# Patient Record
Sex: FEMALE | Race: WHITE | HISPANIC OR LATINO | Employment: UNEMPLOYED | URBAN - METROPOLITAN AREA
[De-identification: names, ages, dates, MRNs, and addresses within clinical notes are randomized per-mention and may not be internally consistent; named-entity substitution may affect disease eponyms.]

---

## 2023-01-17 ENCOUNTER — OFFICE VISIT (OUTPATIENT)
Dept: OBSTETRICS AND GYNECOLOGY | Facility: CLINIC | Age: 23
End: 2023-01-17
Payer: COMMERCIAL

## 2023-01-17 VITALS
BODY MASS INDEX: 32.74 KG/M2 | HEIGHT: 68 IN | DIASTOLIC BLOOD PRESSURE: 82 MMHG | WEIGHT: 216 LBS | HEART RATE: 91 BPM | SYSTOLIC BLOOD PRESSURE: 127 MMHG

## 2023-01-17 DIAGNOSIS — R73.09 ELEVATED HEMOGLOBIN A1C: ICD-10-CM

## 2023-01-17 DIAGNOSIS — N93.9 ABNORMAL UTERINE BLEEDING (AUB): ICD-10-CM

## 2023-01-17 DIAGNOSIS — Z01.419 ENCOUNTER FOR GYNECOLOGICAL EXAMINATION WITHOUT ABNORMAL FINDING: Primary | ICD-10-CM

## 2023-01-17 PROCEDURE — 99385 PREV VISIT NEW AGE 18-39: CPT | Performed by: OBSTETRICS & GYNECOLOGY

## 2023-01-17 PROCEDURE — 3008F BODY MASS INDEX DOCD: CPT | Performed by: OBSTETRICS & GYNECOLOGY

## 2023-01-17 PROCEDURE — 2014F MENTAL STATUS ASSESS: CPT | Performed by: OBSTETRICS & GYNECOLOGY

## 2023-01-17 PROCEDURE — 99203 OFFICE O/P NEW LOW 30 MIN: CPT | Performed by: OBSTETRICS & GYNECOLOGY

## 2023-01-17 RX ORDER — MEDROXYPROGESTERONE ACETATE 10 MG/1
10 TABLET ORAL DAILY
Qty: 10 TABLET | Refills: 2 | Status: SHIPPED | OUTPATIENT
Start: 2023-01-17 | End: 2023-01-27

## 2023-01-17 NOTE — PROGRESS NOTES
"GYN Annual Exam     CC- Here for annual exam.     Esteban Puentes is a 22 y.o. female who presents for annual well woman exam. Periods are irregular, every 3- 6 mths,  lasting 7 days. Dysmenorrhea: mild.  Cyclic symptoms include none. No intermenstrual bleeding, spotting, or discharge.  Pt here to day to discuss irregular periods.     Menarche at 21 years of age   3-6 months x 7 days.   No molimina     OB History    No obstetric history on file.         Current contraception: none- not sexually active  History of abnormal Pap smear: no  Family history of uterine, colon or ovarian cancer: no  History of abnormal mammogram: no  Family history of breast cancer: no  Last Pap :  Not of age.     History reviewed. No pertinent past medical history.    Past Surgical History:   Procedure Laterality Date   • APPENDECTOMY           Current Outpatient Medications:   •  medroxyPROGESTERone (Provera) 10 MG tablet, Take 1 tablet by mouth Daily for 10 doses., Disp: 10 tablet, Rfl: 2    No Known Allergies    Social History     Tobacco Use   • Smoking status: Never   Substance Use Topics   • Alcohol use: Never   • Drug use: Never       Family History   Problem Relation Age of Onset   • No Known Problems Father    • No Known Problems Mother    • Breast cancer Neg Hx    • Ovarian cancer Neg Hx    • Uterine cancer Neg Hx    • Colon cancer Neg Hx    • Deep vein thrombosis Neg Hx    • Pulmonary embolism Neg Hx        Review of Systems   Constitutional: Negative for chills and fever.   Gastrointestinal: Negative for abdominal pain, constipation and diarrhea.   Genitourinary: Positive for menstrual problem. Negative for pelvic pain, vaginal bleeding and vaginal discharge.   All other systems reviewed and are negative.      /82   Pulse 91   Ht 172.7 cm (68\")   Wt 98 kg (216 lb)   LMP 10/21/2022   BMI 32.84 kg/m²     Physical Exam  Constitutional:       General: She is not in acute distress.     Appearance: She is " well-developed. She is obese.   Genitourinary:      Vulva normal.   HENT:      Head: Normocephalic and atraumatic.      Nose: Nose normal.   Eyes:      Conjunctiva/sclera: Conjunctivae normal.      Pupils: Pupils are equal, round, and reactive to light.   Neck:      Thyroid: No thyromegaly.   Cardiovascular:      Rate and Rhythm: Normal rate and regular rhythm.      Heart sounds: Normal heart sounds. No murmur heard.  Pulmonary:      Effort: Pulmonary effort is normal. No respiratory distress.      Breath sounds: Normal breath sounds.   Abdominal:      General: Abdomen is flat. There is no distension.      Palpations: Abdomen is soft.      Tenderness: There is no abdominal tenderness.   Musculoskeletal:         General: No deformity. Normal range of motion.      Cervical back: Normal range of motion and neck supple.      Right lower leg: No edema.      Left lower leg: No edema.   Neurological:      Mental Status: She is alert and oriented to person, place, and time.   Skin:     General: Skin is warm and dry.      Findings: No erythema.   Psychiatric:         Behavior: Behavior normal.         Thought Content: Thought content normal.         Judgment: Judgment normal.   Vitals reviewed. Exam conducted with a chaperone present.               Assessment     Diagnoses and all orders for this visit:    1. Encounter for gynecological examination without abnormal finding (Primary)    2. Abnormal uterine bleeding (AUB)  -     HCG, B-subunit, Quantitative  -     Prolactin  -     CBC & Differential  -     US Non-ob Transvaginal  -     FSH & LH  -     Estradiol  -     Hemoglobin A1c  -     T4, Free  -     TSH  -     Testosterone Free Direct  -     DHEA-Sulfate    Other orders  -     medroxyPROGESTERone (Provera) 10 MG tablet; Take 1 tablet by mouth Daily for 10 doses.  Dispense: 10 tablet; Refill: 2    1)  GYN exam   Virginal female. No STD testing needed  Too young for pap     2) AUB   Suspect PCOS- but did have late  menarche?  So doing work up with labs, ultrasound and provera withdrawal   If PCOS confirmed.   Return to consider menstrual regulation with OCP      Plan     1) Breast Health - Clinical breast exam yearly, Discussed [employer] cancer society recommendations for breast cancer screening, and Self breast awareness monthly  2) Pap - too young   3) Smoking status- non-smoker   4) Encouraged between 7-8 hours of good sleep per night.   5) Follow up prn and one year.       Chuckie Bentley MD   1/17/2023  14:32 EST

## 2023-01-18 LAB
BASOPHILS # BLD AUTO: 0.03 10*3/MM3 (ref 0–0.2)
BASOPHILS NFR BLD AUTO: 0.4 % (ref 0–1.5)
DHEA-S SERPL-MCNC: 346 UG/DL (ref 110–431.7)
EOSINOPHIL # BLD AUTO: 0.15 10*3/MM3 (ref 0–0.4)
EOSINOPHIL NFR BLD AUTO: 1.8 % (ref 0.3–6.2)
ERYTHROCYTE [DISTWIDTH] IN BLOOD BY AUTOMATED COUNT: 11.8 % (ref 12.3–15.4)
ESTRADIOL SERPL-MCNC: 18.2 PG/ML
FSH SERPL-ACNC: 6 MIU/ML
HBA1C MFR BLD: 8.2 % (ref 4.8–5.6)
HCG INTACT+B SERPL-ACNC: <1 MIU/ML
HCT VFR BLD AUTO: 42.3 % (ref 34–46.6)
HGB BLD-MCNC: 14.3 G/DL (ref 12–15.9)
IMM GRANULOCYTES # BLD AUTO: 0.03 10*3/MM3 (ref 0–0.05)
IMM GRANULOCYTES NFR BLD AUTO: 0.4 % (ref 0–0.5)
LH SERPL-ACNC: 14.4 MIU/ML
LYMPHOCYTES # BLD AUTO: 2.77 10*3/MM3 (ref 0.7–3.1)
LYMPHOCYTES NFR BLD AUTO: 33.5 % (ref 19.6–45.3)
MCH RBC QN AUTO: 29.6 PG (ref 26.6–33)
MCHC RBC AUTO-ENTMCNC: 33.8 G/DL (ref 31.5–35.7)
MCV RBC AUTO: 87.6 FL (ref 79–97)
MONOCYTES # BLD AUTO: 0.5 10*3/MM3 (ref 0.1–0.9)
MONOCYTES NFR BLD AUTO: 6 % (ref 5–12)
NEUTROPHILS # BLD AUTO: 4.79 10*3/MM3 (ref 1.7–7)
NEUTROPHILS NFR BLD AUTO: 57.9 % (ref 42.7–76)
NRBC BLD AUTO-RTO: 0 /100 WBC (ref 0–0.2)
PLATELET # BLD AUTO: 285 10*3/MM3 (ref 140–450)
PROLACTIN SERPL-MCNC: 8 NG/ML (ref 4.8–23.3)
RBC # BLD AUTO: 4.83 10*6/MM3 (ref 3.77–5.28)
T4 FREE SERPL-MCNC: 1.22 NG/DL (ref 0.93–1.7)
TSH SERPL DL<=0.005 MIU/L-ACNC: 1.11 UIU/ML (ref 0.27–4.2)
WBC # BLD AUTO: 8.27 10*3/MM3 (ref 3.4–10.8)

## 2023-01-19 ENCOUNTER — TELEPHONE (OUTPATIENT)
Dept: OBSTETRICS AND GYNECOLOGY | Facility: CLINIC | Age: 23
End: 2023-01-19
Payer: COMMERCIAL

## 2023-01-19 ENCOUNTER — PATIENT ROUNDING (BHMG ONLY) (OUTPATIENT)
Dept: OBSTETRICS AND GYNECOLOGY | Facility: CLINIC | Age: 23
End: 2023-01-19
Payer: COMMERCIAL

## 2023-01-19 ENCOUNTER — PATIENT MESSAGE (OUTPATIENT)
Dept: OBSTETRICS AND GYNECOLOGY | Facility: CLINIC | Age: 23
End: 2023-01-19
Payer: COMMERCIAL

## 2023-01-19 LAB — TESTOST FREE SERPL-MCNC: 7.3 PG/ML (ref 0–4.2)

## 2023-01-19 NOTE — PROGRESS NOTES
Elba, her blood work for abnormal uterine bleeding was normal.  Specifically not pregnant or anemic, and her prolactin, thyroid and platelets were normal. However also checked a few other tests. Her testosterone is elevated and need to repeat this down the road and HgA1c was significantly elevated. She is likely a diabetic.  We will be making a referral to primary care so that they can evaluate and begin treatment of her likely type II DM. I used  to review a lot of this but her call dropped twice and was only able to give her the information, not instructions. Needs to make sure follow up with us to review in office as well. Let Jeanie know about referral as well. Thanks, Dr. Bentley

## 2023-01-19 NOTE — PROGRESS NOTES
My chart message has been sent to the patient for PATIENT ROUNDING with [facility].

## 2023-01-19 NOTE — TELEPHONE ENCOUNTER
----- Message from Chuckie Bentley MD sent at 1/19/2023 12:41 PM EST -----  Elba, her blood work for abnormal uterine bleeding was normal.  Specifically not pregnant or anemic, and her prolactin, thyroid and platelets were normal. However also checked a few other tests. Her testosterone is elevated and need to repeat this down the road and HgA1c was significantly elevated. She is likely a diabetic.  We will be making a referral to primary care so that they can evaluate and begin treatment of her likely type II DM. I used  to review a lot of this but her call dropped twice and was only able to give her the information, not instructions. Needs to make sure follow up with us to review in office as well. Let Jeanie know about referral as well. Thanks, Dr. Bentley

## 2023-02-07 ENCOUNTER — TELEPHONE (OUTPATIENT)
Dept: OBSTETRICS AND GYNECOLOGY | Facility: CLINIC | Age: 23
End: 2023-02-07
Payer: COMMERCIAL

## 2023-02-07 ENCOUNTER — OFFICE VISIT (OUTPATIENT)
Dept: OBSTETRICS AND GYNECOLOGY | Facility: CLINIC | Age: 23
End: 2023-02-07
Payer: COMMERCIAL

## 2023-02-07 VITALS
WEIGHT: 216 LBS | HEIGHT: 68 IN | BODY MASS INDEX: 32.74 KG/M2 | SYSTOLIC BLOOD PRESSURE: 122 MMHG | DIASTOLIC BLOOD PRESSURE: 80 MMHG | HEART RATE: 82 BPM

## 2023-02-07 DIAGNOSIS — E28.2 PCOS (POLYCYSTIC OVARIAN SYNDROME): ICD-10-CM

## 2023-02-07 DIAGNOSIS — Z30.011 OCP (ORAL CONTRACEPTIVE PILLS) INITIATION: ICD-10-CM

## 2023-02-07 DIAGNOSIS — R73.09 ELEVATED HEMOGLOBIN A1C: ICD-10-CM

## 2023-02-07 DIAGNOSIS — N93.9 ABNORMAL UTERINE BLEEDING (AUB): Primary | ICD-10-CM

## 2023-02-07 PROCEDURE — 99214 OFFICE O/P EST MOD 30 MIN: CPT | Performed by: OBSTETRICS & GYNECOLOGY

## 2023-02-07 RX ORDER — NORGESTIMATE AND ETHINYL ESTRADIOL 0.25-0.035
1 KIT ORAL DAILY
Qty: 84 TABLET | Refills: 3 | Status: SHIPPED | OUTPATIENT
Start: 2023-02-07

## 2023-02-07 NOTE — TELEPHONE ENCOUNTER
L/m via , for pt to call the office regarding a referral to Internal Medicine/Primary Care Physician.    The scheduling HUB has attempted to contact her to schedule an appointment w/a PCP, but no answer, and no return call.  Please encourage pt to call [phone] at her earliest convenience to schedule.       Pt # [phone]

## 2023-02-07 NOTE — TELEPHONE ENCOUNTER
----- Message from Chuckie Bentley MD sent at 2/7/2023 12:08 PM EST -----  Jeanie, I think we sent in referral to PCP to start evaluation and management of possible Type II DM. Can you follow up and make sure this is happening. Thanks, Dr. Bentley

## 2023-02-07 NOTE — PROGRESS NOTES
"Subjective    is a 22 y.o. female following up from US and labs for AUB.     Chief Complaint   Patient presents with   • Follow-up        HPI     22 y.o. G0   Seen for annual last month with AUB    Menarche at 21 years of age   3-6 months x 7 days.   No molimina   Here to review findings, discuss results and treatment options       Review of Systems   Constitutional: Negative for chills and fever.   Gastrointestinal: Negative for abdominal pain, constipation and diarrhea.   Genitourinary: Positive for menstrual problem. Negative for pelvic pain, vaginal bleeding and vaginal discharge.        Objective   /80   Pulse 82   Ht 172.7 cm (68\")   Wt 98 kg (216 lb)   BMI 32.84 kg/m²   Physical Exam  Constitutional:       General: She is not in acute distress.     Appearance: Normal appearance. She is well-developed and normal weight.   Neck:      Thyroid: No thyromegaly.   Pulmonary:      Effort: No respiratory distress.   Abdominal:      General: Abdomen is flat. There is no distension.      Palpations: Abdomen is soft.      Tenderness: There is no abdominal tenderness.   Musculoskeletal:      Right lower leg: No edema.      Left lower leg: No edema.   Neurological:      Mental Status: She is alert and oriented to person, place, and time.   Skin:     General: Skin is warm and dry.   Psychiatric:         Behavior: Behavior normal.         Thought Content: Thought content normal.         Judgment: Judgment normal.   Vitals reviewed.        GYN ultrasound today   Normal uterus, ovaries, Nothing to explain bleeding or require follow up    Lab Results   Component Value Date    WBC 8.27 01/17/2023    HGB 14.3 01/17/2023    HCT 42.3 01/17/2023    MCV 87.6 01/17/2023     01/17/2023     PRL/TSH - Normal   HgA1c elevated to Type II DM range  DHEA normal   Testosterone - mild elevation   HCG negative   FSH/LH/E2 - normal         Assessment/Plan   Diagnoses and all orders for this visit:    1. Abnormal uterine " bleeding (AUB) (Primary)    2. PCOS (polycystic ovarian syndrome)    3. Elevated hemoglobin A1c    4. OCP (oral contraceptive pills) initiation    Other orders  -     norgestimate-ethinyl estradiol (MonoNessa) 0.25-35 MG-MCG per tablet; Take 1 tablet by mouth Daily.  Dispense: 84 tablet; Refill: 3    Did not  provera to do provera withdrawal   Otherwise picture is clinically consistent with 1) PCOS/chronic anovulation and 2) Type II DM    Discussed use of OCP to regulate bleeding with PCOS  Discussed referral to PCP (made earlier) to get evaluation and management of elevated HgA1c, possible type II DM     Okay to use OCP to treat AUB/PCOS  How to start   Common side effects  Life threatening complications   Efficiency reviewed.     Follow up if issues or annual next R[place]     Chuckie Bentley MD   2/7/2023  12:08 EST

## 2023-02-10 NOTE — TELEPHONE ENCOUNTER
Second msg left for pt to call and schedule appt w/PCP.  Scheduling HUB mailed letter to pt to call to schedule w/PCP.    Scheduling [facility] [phone]